# Patient Record
Sex: FEMALE | Race: WHITE
[De-identification: names, ages, dates, MRNs, and addresses within clinical notes are randomized per-mention and may not be internally consistent; named-entity substitution may affect disease eponyms.]

---

## 2018-02-13 ENCOUNTER — HOSPITAL ENCOUNTER (OUTPATIENT)
Dept: HOSPITAL 62 - WI | Age: 58
End: 2018-02-13
Attending: NURSE PRACTITIONER
Payer: OTHER GOVERNMENT

## 2018-02-13 DIAGNOSIS — Z12.31: Primary | ICD-10-CM

## 2018-02-13 PROCEDURE — 77067 SCR MAMMO BI INCL CAD: CPT

## 2018-02-13 NOTE — WOMENS IMAGING REPORT
EXAM DESCRIPTION:  BILAT SCREENING MAMMO W/CAD



COMPLETED DATE/TIME:  2/13/2018 8:40 am



REASON FOR STUDY:  ROUTINE SCREENING; Z12.31 Z12.31  ENCNTR SCREEN MAMMOGRAM FOR MALIGNANT NEOPLASM O
F LAURA



COMPARISON:  2012, 2014



TECHNIQUE:  Standard craniocaudal and mediolateral oblique views of each breast recorded using [place]
l acquisition.



LIMITATIONS:  None.



FINDINGS:  No masses, calcifications or architectural distortion. No areas of suspicion.

Read with the assistance of CAD.

.[facility] - R2 C[facility] Version 1.3

.[facility] - R2 Cenova Version 1.3

.[facility] - R2 Cenova Version 2.4

[place]2 [place] 2.4

.[facility] - R2  Version 9.2



IMPRESSION:  NORMAL MAMMOGRAM.  BIRADS 1.



BREAST DENSITY:  b. There are scattered areas of fibroglandular density.



BIRAD:  1 NEGATIVE



RECOMMENDATION:  ROUTINE SCREENING



COMMENT:  The patient has been notified of the results by letter per SA requirements. Additional no
tification policies are in place for contacting patient with suspicious or incomplete findings.

Quality ID #225: The American College of Radiology recommends an annual screening mammogram for women
 aged 40 years or over. This facility utilizes a reminder system to ensure that all patients receive 
reminder letters, and/or direct phone calls for appointments. This includes reminders for routine scr
eening mammograms, diagnostic mammograms, or other Breast Imaging Interventions when appropriate.  Th
is patient will be placed in the appropriate reminder system.

The American College of Radiology (ACR) has developed recommendations for screening MRI of the breast
s in certain patient populations, to be used in conjunction with mammography.  Breast MRI surveillanc
e may be appropriate for women with more than 20% lifetime risk of developing breast cancer  as deter
mined by genetic testing, significant family history of the disease, or history of mantle radiation f
or Hodgkins Disease.  ACR Practice Guidelines 2008.



TECHNICAL DOCUMENTATION:  FINDING NUMBER: (1)

ASSESSMENT: (1)

JOB ID:  [id number]

 2011 [employer]- All Rights Reserved

## 2020-11-19 ENCOUNTER — HOSPITAL ENCOUNTER (OUTPATIENT)
Dept: HOSPITAL 62 - RAD | Age: 60
End: 2020-11-19
Attending: PHYSICIAN ASSISTANT
Payer: OTHER GOVERNMENT

## 2020-11-19 DIAGNOSIS — M25.462: ICD-10-CM

## 2020-11-19 DIAGNOSIS — M23.92: Primary | ICD-10-CM

## 2020-11-19 DIAGNOSIS — M17.12: ICD-10-CM

## 2020-11-19 NOTE — RADIOLOGY REPORT (SQ)
EXAM DESCRIPTION:  MRI LT LOWER JOINT WITHOUT



IMAGES COMPLETED DATE/TIME:  11/19/2020 10:39 am



REASON FOR STUDY:  UNSPECIFIED INTERNAL DERANGEMENT OF LEFT KNEE M23.92  UNSPECIFIED INTERNAL DERANGE
MENT OF LEFT KNEE



COMPARISON:  None.



TECHNIQUE:  Leftknee images acquired and stored on PACS.  Multiplanar images include fat sensitive se
quences as T1, water sensitive sequences as FST2 or STIR, cartilage sensitive sequences as FSPD, and 
gradient echo sequences.



LIMITATIONS:  None.



FINDINGS:  JOINT AND BURSAE: Joint effusion.

BONE CORTEX AND MARROW: No alteration of signal to suggest marrow replacement. No worrisome bone lesi
ons. No occult fracture.

ACL: Intact. No degeneration or ganglion cyst.

PCL: Intact.

MCL: Intact. No periligamentous edema or fluid.

LCL: Intact. No periligamentous edema or fluid.

MEDIAL MENISCUS: Medial extrusion.  Horizontal tear posterior horn.  8 mm oval focus of abnormal sign
al anterior to the PCL suspicious for meniscal fragment or cartilage fragment.

LATERAL MENISCUS: No tears. No abnormal signal.

MEDIAL COMPARTMENT: Mild osteoarthritis.  Subchondral edema tibial plateau.

LATERAL COMPARTMENT: Relatively preserved cartilage.  No significant osteophytes.  Subchondral edema 
tibial plateau.

PATELLA: Full-thickness fissuring of the patellar cartilage medial to midline.  No large osteophytes.
  Intact retinaculum.

EXTENSOR MECHANISM: Intact.  Mild prepatellar edema.  No organized bursal fluid.

SOFT TISSUES: Adjacent muscles and subcutaneous tissues normal.  Normal flow void in popliteal artery
 and vein.

OTHER: No other significant finding.



IMPRESSION:

1. Horizontal tear posterior horn medial meniscus.  Meniscal fragment or noncalcified loose body in t
he midline.

2. Chondromalacia and osteoarthritis.  Subchondral edema medial and lateral tibial plateau.

3. Large joint effusion.



TECHNICAL DOCUMENTATION:  JOB ID:  [id number]

 2011 [employer]- All Rights Reserved



Reading location - IP/workstation name: [email or web address]